# Patient Record
Sex: MALE | URBAN - METROPOLITAN AREA
[De-identification: names, ages, dates, MRNs, and addresses within clinical notes are randomized per-mention and may not be internally consistent; named-entity substitution may affect disease eponyms.]

---

## 2021-02-19 ENCOUNTER — OFFICE VISIT (OUTPATIENT)
Dept: URBAN - METROPOLITAN AREA CLINIC 7 | Facility: CLINIC | Age: 41
End: 2021-02-19
Payer: MEDICARE

## 2021-02-19 PROCEDURE — 92134 CPTRZ OPH DX IMG PST SGM RTA: CPT | Performed by: OPHTHALMOLOGY

## 2021-02-19 PROCEDURE — 99214 OFFICE O/P EST MOD 30 MIN: CPT | Performed by: OPHTHALMOLOGY

## 2021-02-19 PROCEDURE — 67028 INJECTION EYE DRUG: CPT | Performed by: OPHTHALMOLOGY

## 2021-02-19 ASSESSMENT — INTRAOCULAR PRESSURE
OS: 15
OD: 13

## 2021-02-19 NOTE — IMPRESSION/PLAN
Impression: Central retinal vein occlusion, left eye, with macular edema: H34.8120 Left. s/p Avastin x 5 last 8/14/2020 Plan: --pt lost to f/u for 6 months
--exam/OCT reveal recurrent CME OS
--findings d/w pt, rec restarting anti-VEGF tx to maintain vision  
--r/b/a of Avastin for CRVO discussed, pt understands Avastin is considered off-label --pt elects to restart tx with Avastin today --pt instructed to call with immediately with s/s VA loss/pain
--control BP/lipids RTC 6 weeks for OCT OU re-eval

## 2021-02-19 NOTE — IMPRESSION/PLAN
Impression: Type 2 diab with mild nonp rtnop without macular edema, bi: [id number] Bilateral. Plan: --findings/diagnosis d/w pt in detail --tx not indicated at this time
--importance of tight BS/BP/lipid control discussed
--coordinate care with PCP to reduce systemic complications of DM
--monitor annually

## 2021-04-02 ENCOUNTER — OFFICE VISIT (OUTPATIENT)
Dept: URBAN - METROPOLITAN AREA CLINIC 7 | Facility: CLINIC | Age: 41
End: 2021-04-02
Payer: MEDICARE

## 2021-04-02 DIAGNOSIS — H34.8120 CENTRAL RETINAL VEIN OCCLUSION, LEFT EYE, WITH MACULAR EDEMA: Primary | ICD-10-CM

## 2021-04-02 PROCEDURE — 92134 CPTRZ OPH DX IMG PST SGM RTA: CPT | Performed by: OPHTHALMOLOGY

## 2021-04-02 PROCEDURE — 67028 INJECTION EYE DRUG: CPT | Performed by: OPHTHALMOLOGY

## 2021-04-02 ASSESSMENT — INTRAOCULAR PRESSURE
OS: 13
OD: 12

## 2021-04-02 NOTE — IMPRESSION/PLAN
Impression: Type 2 diab with mild nonp rtnop without macular edema, bi: [id number] Bilateral. Plan: --findings/diagnosis d/w pt in detail --tx not indicated at this time
--importance of tight BS/BP/lipid control discussed
--coordinate care with PCP to reduce systemic complications of DM

## 2021-04-02 NOTE — IMPRESSION/PLAN
Impression: Central retinal vein occlusion, left eye, with macular edema: H34.8120 Left. s/p Avastin x 6 last 2/19/21 Plan: --exam/OCT reveal improving CME OS s/p Avastin 6 weeks ago
--findings d/w pt, rec continuing anti-VEGF tx to maintain vision, extend interval as tolerated --r/b/a of Avastin for CRVO discussed, pt understands Avastin is considered off-label --pt elects to cont tx with Avastin today --pt instructed to call with immediately with s/s VA loss/pain
--control BP/lipids RTC 8 weeks for OCT OU re-eval Avastin

## 2021-07-09 ENCOUNTER — OFFICE VISIT (OUTPATIENT)
Dept: URBAN - METROPOLITAN AREA CLINIC 7 | Facility: CLINIC | Age: 41
End: 2021-07-09
Payer: MEDICARE

## 2021-07-09 DIAGNOSIS — E11.3293 TYPE 2 DIABETES MELLITUS WITH MILD NONPROLIFERATIVE DIABETIC RETINOPATHY WITHOUT MACULAR EDEMA, BILATERAL: ICD-10-CM

## 2021-07-09 PROCEDURE — 92134 CPTRZ OPH DX IMG PST SGM RTA: CPT | Performed by: OPHTHALMOLOGY

## 2021-07-09 PROCEDURE — 99213 OFFICE O/P EST LOW 20 MIN: CPT | Performed by: OPHTHALMOLOGY

## 2021-07-09 ASSESSMENT — INTRAOCULAR PRESSURE
OD: 13
OS: 14

## 2021-07-09 NOTE — IMPRESSION/PLAN
Impression: Central retinal vein occlusion, left eye, with macular edema: H34.8120 Left. s/p Avastin x 7 last 04/02/21 Plan: --exam/OCT reveal resolved CME OS s/p Avastin 3 months ago
--findings discussed with pt in detail
--additional tx not recommended at this time --continue tight control of BP and lipids RTC 3 months OCT OU re-eval

## 2022-06-17 NOTE — IMPRESSION/PLAN
Impression: Type 2 diab with mild nonp rtnop without macular edema, bi: [id number] Bilateral. Plan: --findings/diagnosis d/w pt in detail --tx not indicated at this time
--importance of tight BS/BP/lipid control discussed
--coordinate care with PCP to reduce systemic complications of DM

## 2022-06-17 NOTE — IMPRESSION/PLAN
Impression: Central retinal vein occlusion, left eye, with macular edema: H34.8120 Left. s/p Avastin x 7 last 04/02/21 Plan: --patient lost to follow-up for 11 months
--exam/OCT reveal a new CRVO with recurrent dense CME
--findings d/w pt, rec restarting anti-VEGF to maintain vision  
--r/b/a of Avastin for CRVO discussed --pt understands Avastin is considered off-label for CRVO
--pt elects to restart treatment with Avastin
--Avastin 1.25 mg/.05 ml performed successfully w/o complication
--plan new series of 3 monthly injections to stabilize CME
--pt instructed to call immediately with s/s VA loss/pain
--control BP/lipids RTC 1 month Avastin OS #2/3

## 2022-09-02 NOTE — IMPRESSION/PLAN
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  Plan: --pt complains of new bleeding in his right eye
--exam/OCT reveal moderate NPDR OU without DME 
--treatment is not indicated at this time
--importance of tight BS/BP/lipid control discussed
--coordinate care with PCP to reduce systemic complications of DM
--monitor semi-annually

## 2022-09-02 NOTE — IMPRESSION/PLAN
Impression: Central retinal vein occlusion, left eye, with macular edema: H34.8120 Left. s/p Avastin x 10 last 07/29/22 Plan: --exam/OCT reveal a stable CRVO with improving CME
--findings d/w pt, rec restarting anti-VEGF to maintain vision  
--r/b/a of Avastin for CRVO discussed --pt understands Avastin is considered off-label for CRVO
--pt elects to continue treatment with Avastin
--Avastin 1.25 mg/.05 ml performed successfully w/o complication --pt instructed to call immediately with s/s VA loss/pain
--control BP/lipids RTC 1 month for DFE/OCT OU re-eval

## 2022-10-14 ENCOUNTER — OFFICE VISIT (OUTPATIENT)
Dept: URBAN - METROPOLITAN AREA CLINIC 7 | Facility: CLINIC | Age: 42
End: 2022-10-14
Payer: MEDICARE

## 2022-10-14 DIAGNOSIS — E11.3393 TYPE 2 DIAB WITH MOD NONP RTNOP WITHOUT MACULAR EDEMA, BI: ICD-10-CM

## 2022-10-14 DIAGNOSIS — H34.8120 CENTRAL RETINAL VEIN OCCLUSION, LEFT EYE, WITH MACULAR EDEMA: Primary | ICD-10-CM

## 2022-10-14 PROCEDURE — 67028 INJECTION EYE DRUG: CPT | Performed by: OPHTHALMOLOGY

## 2022-10-14 PROCEDURE — 92134 CPTRZ OPH DX IMG PST SGM RTA: CPT | Performed by: OPHTHALMOLOGY

## 2022-10-14 ASSESSMENT — INTRAOCULAR PRESSURE
OD: 17
OS: 14

## 2022-10-14 NOTE — IMPRESSION/PLAN
Impression: Central retinal vein occlusion, left eye, with macular edema: H34.8120 Left. s/p Avastin x 11, last 09/02/22 Plan: --exam/OCT reveal CRVO with stable, mild CME OS
--findings d/w pt, rec restarting anti-VEGF to maintain vision  
--r/b/a of Avastin for CRVO discussed --pt understands Avastin is considered off-label for CRVO
--pt elects to continue treatment with Avastin
--Avastin 1.25 mg/.05 ml performed successfully w/o complication --pt instructed to call immediately with s/s VA loss/pain
--control BP/lipids RTC 8 weeks for DFE/OCT OU re-eval

## 2022-10-14 NOTE — IMPRESSION/PLAN
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  Plan: --pt complains of new bleeding in his right eye
--exam/OCT reveal moderate NPDR OU without DME 
--treatment is not indicated at this time
--importance of tight BS/BP/lipid control discussed
--coordinate care with PCP to reduce systemic complications of DM
--monitor semi-annually

## 2022-12-09 ENCOUNTER — OFFICE VISIT (OUTPATIENT)
Dept: URBAN - METROPOLITAN AREA CLINIC 7 | Facility: CLINIC | Age: 42
End: 2022-12-09
Payer: MEDICARE

## 2022-12-09 DIAGNOSIS — E11.3393 TYPE 2 DIAB WITH MOD NONP RTNOP WITHOUT MACULAR EDEMA, BI: ICD-10-CM

## 2022-12-09 DIAGNOSIS — H34.8120 CENTRAL RETINAL VEIN OCCLUSION, LEFT EYE, WITH MACULAR EDEMA: Primary | ICD-10-CM

## 2022-12-09 PROCEDURE — 67028 INJECTION EYE DRUG: CPT | Performed by: OPHTHALMOLOGY

## 2022-12-09 PROCEDURE — 92134 CPTRZ OPH DX IMG PST SGM RTA: CPT | Performed by: OPHTHALMOLOGY

## 2022-12-09 ASSESSMENT — INTRAOCULAR PRESSURE
OS: 14
OD: 11

## 2022-12-09 NOTE — IMPRESSION/PLAN
Impression: Age-related nuclear cataract, bilateral: H25.13. Plan: Observe for now without intervention. The patient was advised to contact us if any change or worsening of vision. 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  Plan: --exam/OCT reveal moderate NPDR OU without DME 
--treatment is not indicated at this time
--importance of tight BS/BP/lipid control discussed
--coordinate care with PCP to reduce systemic complications of DM
--monitor semi-annually

## 2022-12-09 NOTE — IMPRESSION/PLAN
Impression: Central retinal vein occlusion, left eye, with macular edema: H34.8120 Left. s/p Avastin x 12, last 10/14/22 Plan: --exam/OCT reveal CRVO with improving CME OS
--findings d/w pt, rec tapering anti-VEGF to maintain vision  
--r/b/a of Avastin for CRVO discussed --pt understands Avastin is considered off-label for CRVO
--pt elects to continue treatment with Avastin
--Avastin 1.25 mg/.05 ml performed successfully w/o complication --pt instructed to call immediately with s/s VA loss/pain
--control BP/lipids RTC 12 weeks for DFE/OCT OU re-eval

## 2023-03-03 ENCOUNTER — OFFICE VISIT (OUTPATIENT)
Dept: URBAN - METROPOLITAN AREA CLINIC 7 | Facility: CLINIC | Age: 43
End: 2023-03-03
Payer: MEDICARE

## 2023-03-03 DIAGNOSIS — E11.3393 TYPE 2 DIAB WITH MOD NONP RTNOP WITHOUT MACULAR EDEMA, BI: ICD-10-CM

## 2023-03-03 DIAGNOSIS — H34.8120 CENTRAL RETINAL VEIN OCCLUSION, LEFT EYE, WITH MACULAR EDEMA: Primary | ICD-10-CM

## 2023-03-03 PROCEDURE — 92134 CPTRZ OPH DX IMG PST SGM RTA: CPT | Performed by: OPHTHALMOLOGY

## 2023-03-03 PROCEDURE — 99213 OFFICE O/P EST LOW 20 MIN: CPT | Performed by: OPHTHALMOLOGY

## 2023-03-03 ASSESSMENT — INTRAOCULAR PRESSURE
OD: 14
OS: 10

## 2023-03-03 NOTE — IMPRESSION/PLAN
Impression: Central retinal vein occlusion, left eye, with macular edema: H34.8120 Left. 
s/p Avastin x 13, last 12/09/22 Plan: --exam/OCT reveal CRVO with minimal CME OS
--findings discussed with pt in detail
--additional treatment not recommended
--control BP and lipids and follow-up with PCP

RTC 3-4 months for DFE/OCT OU

## 2023-03-03 NOTE — IMPRESSION/PLAN
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  Plan: --exam/OCT reveal moderate NPDR OU without DME 
--treatment is not indicated at this time
--importance of tight BS/BP/lipid control discussed
--coordinate care with PCP to reduce systemic complications of DM
--monitor semi-annually

## 2023-07-06 ENCOUNTER — OFFICE VISIT (OUTPATIENT)
Dept: URBAN - METROPOLITAN AREA CLINIC 7 | Facility: CLINIC | Age: 43
End: 2023-07-06
Payer: MEDICARE

## 2023-07-06 DIAGNOSIS — H25.13 AGE-RELATED NUCLEAR CATARACT, BILATERAL: ICD-10-CM

## 2023-07-06 DIAGNOSIS — E11.3393 TYPE 2 DIAB WITH MOD NONP RTNOP WITHOUT MACULAR EDEMA, BI: ICD-10-CM

## 2023-07-06 DIAGNOSIS — H34.8120 CENTRAL RETINAL VEIN OCCLUSION, LEFT EYE, WITH MACULAR EDEMA: Primary | ICD-10-CM

## 2023-07-06 PROCEDURE — 92134 CPTRZ OPH DX IMG PST SGM RTA: CPT | Performed by: STUDENT IN AN ORGANIZED HEALTH CARE EDUCATION/TRAINING PROGRAM

## 2023-07-06 PROCEDURE — 67028 INJECTION EYE DRUG: CPT | Performed by: STUDENT IN AN ORGANIZED HEALTH CARE EDUCATION/TRAINING PROGRAM

## 2023-07-06 PROCEDURE — 99213 OFFICE O/P EST LOW 20 MIN: CPT | Performed by: STUDENT IN AN ORGANIZED HEALTH CARE EDUCATION/TRAINING PROGRAM

## 2023-07-06 ASSESSMENT — INTRAOCULAR PRESSURE
OS: 19
OD: 15

## 2023-07-06 NOTE — IMPRESSION/PLAN
Impression: Central retinal vein occlusion, left eye, with macular edema: H34.8120 Left. s/p Avastin x 13, last 12/09/22 ()
OCT: wnl OD;  significant edema OS Plan: --exam/OCT reveal CRVO with worsening CME OS now 7 months after last Avastin OS ()
--findings discussed with pt in detail
-recommend anti-VEGF OS given improvement in edema and vision previously
-r/b/a Avastin OS, pt elects to proceed
-return precautions
-BP / lipid control RTC: 2-3 months OCT OU re-eval Avastin OS

## 2023-07-06 NOTE — IMPRESSION/PLAN
Impression: Type 2 diab with mod nonp rtnop without macular edema, bi: [id number].  Plan: --exam/OCT reveal moderate NPDR OU without DME OD
--anti-VEGF OS as agove for RVO
--importance of tight BS/BP/lipid control discussed
--coordinate care with PCP to reduce systemic complications of DM
--monitor semi-annually

## 2023-10-23 ENCOUNTER — OFFICE VISIT (OUTPATIENT)
Dept: URBAN - METROPOLITAN AREA CLINIC 7 | Facility: CLINIC | Age: 43
End: 2023-10-23
Payer: MEDICARE

## 2023-10-23 DIAGNOSIS — E11.3393 TYPE 2 DIABETES MELLITUS WITH MODERATE NONPROLIFERATIVE DIABETIC RETINOPATHY WITHOUT MACULAR EDEMA, BILATERAL: ICD-10-CM

## 2023-10-23 DIAGNOSIS — H25.13 AGE-RELATED NUCLEAR CATARACT, BILATERAL: ICD-10-CM

## 2023-10-23 DIAGNOSIS — H34.8120 CENTRAL RETINAL VEIN OCCLUSION, LEFT EYE, WITH MACULAR EDEMA: Primary | ICD-10-CM

## 2023-10-23 PROCEDURE — 67028 INJECTION EYE DRUG: CPT | Performed by: OPHTHALMOLOGY

## 2023-10-23 PROCEDURE — 92134 CPTRZ OPH DX IMG PST SGM RTA: CPT | Performed by: OPHTHALMOLOGY

## 2023-10-23 PROCEDURE — 99213 OFFICE O/P EST LOW 20 MIN: CPT | Performed by: OPHTHALMOLOGY

## 2023-10-23 ASSESSMENT — INTRAOCULAR PRESSURE
OD: 19
OS: 19

## 2024-02-26 ENCOUNTER — OFFICE VISIT (OUTPATIENT)
Dept: URBAN - METROPOLITAN AREA CLINIC 7 | Facility: CLINIC | Age: 44
End: 2024-02-26
Payer: MEDICARE

## 2024-02-26 DIAGNOSIS — H34.8120 CENTRAL RETINAL VEIN OCCLUSION, LEFT EYE, WITH MACULAR EDEMA: Primary | ICD-10-CM

## 2024-02-26 DIAGNOSIS — H25.13 AGE-RELATED NUCLEAR CATARACT, BILATERAL: ICD-10-CM

## 2024-02-26 DIAGNOSIS — E11.3393 TYPE 2 DIAB WITH MOD NONP RTNOP WITHOUT MACULAR EDEMA, BI: ICD-10-CM

## 2024-02-26 PROCEDURE — 67028 INJECTION EYE DRUG: CPT | Performed by: OPHTHALMOLOGY

## 2024-02-26 PROCEDURE — 99213 OFFICE O/P EST LOW 20 MIN: CPT | Performed by: OPHTHALMOLOGY

## 2024-02-26 PROCEDURE — 92134 CPTRZ OPH DX IMG PST SGM RTA: CPT | Performed by: OPHTHALMOLOGY

## 2024-02-26 ASSESSMENT — INTRAOCULAR PRESSURE
OD: 15
OS: 18

## 2024-06-24 ENCOUNTER — OFFICE VISIT (OUTPATIENT)
Dept: URBAN - METROPOLITAN AREA CLINIC 7 | Facility: CLINIC | Age: 44
End: 2024-06-24
Payer: MEDICARE

## 2024-06-24 DIAGNOSIS — H34.8120 CENTRAL RETINAL VEIN OCCLUSION, LEFT EYE, WITH MACULAR EDEMA: Primary | ICD-10-CM

## 2024-06-24 DIAGNOSIS — E11.3393 TYPE 2 DIAB WITH MOD NONP RTNOP WITHOUT MACULAR EDEMA, BI: ICD-10-CM

## 2024-06-24 DIAGNOSIS — H25.13 AGE-RELATED NUCLEAR CATARACT, BILATERAL: ICD-10-CM

## 2024-06-24 PROCEDURE — 92134 CPTRZ OPH DX IMG PST SGM RTA: CPT | Performed by: OPHTHALMOLOGY

## 2024-06-24 PROCEDURE — 99213 OFFICE O/P EST LOW 20 MIN: CPT | Performed by: OPHTHALMOLOGY

## 2024-06-24 PROCEDURE — 67028 INJECTION EYE DRUG: CPT | Performed by: OPHTHALMOLOGY

## 2024-06-24 ASSESSMENT — INTRAOCULAR PRESSURE
OS: 19
OD: 15

## 2024-11-18 ENCOUNTER — OFFICE VISIT (OUTPATIENT)
Dept: URBAN - METROPOLITAN AREA CLINIC 7 | Facility: CLINIC | Age: 44
End: 2024-11-18
Payer: MEDICARE

## 2024-11-18 DIAGNOSIS — E11.3393 TYPE 2 DIAB WITH MOD NONP RTNOP WITHOUT MACULAR EDEMA, BI: ICD-10-CM

## 2024-11-18 DIAGNOSIS — H34.8120 CENTRAL RETINAL VEIN OCCLUSION, LEFT EYE, WITH MACULAR EDEMA: Primary | ICD-10-CM

## 2024-11-18 DIAGNOSIS — H25.13 AGE-RELATED NUCLEAR CATARACT, BILATERAL: ICD-10-CM

## 2024-11-18 PROCEDURE — 67028 INJECTION EYE DRUG: CPT | Performed by: OPHTHALMOLOGY

## 2024-11-18 PROCEDURE — 99214 OFFICE O/P EST MOD 30 MIN: CPT | Performed by: OPHTHALMOLOGY

## 2024-11-18 PROCEDURE — 92134 CPTRZ OPH DX IMG PST SGM RTA: CPT | Performed by: OPHTHALMOLOGY

## 2024-11-18 ASSESSMENT — INTRAOCULAR PRESSURE
OS: 12
OD: 11

## 2025-04-07 ENCOUNTER — OFFICE VISIT (OUTPATIENT)
Dept: URBAN - METROPOLITAN AREA CLINIC 7 | Facility: CLINIC | Age: 45
End: 2025-04-07
Payer: MEDICARE

## 2025-04-07 DIAGNOSIS — E11.3393 TYPE 2 DIAB WITH MOD NONP RTNOP WITHOUT MACULAR EDEMA, BI: ICD-10-CM

## 2025-04-07 DIAGNOSIS — H34.8120 CENTRAL RETINAL VEIN OCCLUSION, LEFT EYE, WITH MACULAR EDEMA: Primary | ICD-10-CM

## 2025-04-07 DIAGNOSIS — H25.13 AGE-RELATED NUCLEAR CATARACT, BILATERAL: ICD-10-CM

## 2025-04-07 PROCEDURE — 99213 OFFICE O/P EST LOW 20 MIN: CPT | Performed by: OPHTHALMOLOGY

## 2025-04-07 PROCEDURE — 92134 CPTRZ OPH DX IMG PST SGM RTA: CPT | Performed by: OPHTHALMOLOGY

## 2025-04-07 PROCEDURE — 67028 INJECTION EYE DRUG: CPT | Performed by: OPHTHALMOLOGY

## 2025-04-07 ASSESSMENT — INTRAOCULAR PRESSURE
OS: 18
OD: 22

## 2025-07-21 ENCOUNTER — OFFICE VISIT (OUTPATIENT)
Dept: URBAN - METROPOLITAN AREA CLINIC 7 | Facility: CLINIC | Age: 45
End: 2025-07-21
Payer: MEDICARE

## 2025-07-21 DIAGNOSIS — H25.13 AGE-RELATED NUCLEAR CATARACT, BILATERAL: ICD-10-CM

## 2025-07-21 DIAGNOSIS — H34.8120 CENTRAL RETINAL VEIN OCCLUSION, LEFT EYE, WITH MACULAR EDEMA: Primary | ICD-10-CM

## 2025-07-21 DIAGNOSIS — E11.3393 TYPE 2 DIAB WITH MOD NONP RTNOP WITHOUT MACULAR EDEMA, BI: ICD-10-CM

## 2025-07-21 PROCEDURE — 67028 INJECTION EYE DRUG: CPT | Performed by: OPHTHALMOLOGY

## 2025-07-21 PROCEDURE — 92134 CPTRZ OPH DX IMG PST SGM RTA: CPT | Performed by: OPHTHALMOLOGY

## 2025-07-21 PROCEDURE — 99213 OFFICE O/P EST LOW 20 MIN: CPT | Performed by: OPHTHALMOLOGY

## 2025-07-21 ASSESSMENT — INTRAOCULAR PRESSURE
OS: 13
OD: 14